# Patient Record
Sex: FEMALE | Race: BLACK OR AFRICAN AMERICAN | NOT HISPANIC OR LATINO | Employment: FULL TIME | URBAN - METROPOLITAN AREA
[De-identification: names, ages, dates, MRNs, and addresses within clinical notes are randomized per-mention and may not be internally consistent; named-entity substitution may affect disease eponyms.]

---

## 2017-01-01 ENCOUNTER — HOSPITAL ENCOUNTER (INPATIENT)
Facility: HOSPITAL | Age: 52
LOS: 1 days | DRG: 597 | End: 2017-01-23
Attending: EMERGENCY MEDICINE | Admitting: HOSPITALIST
Payer: COMMERCIAL

## 2017-01-01 VITALS
WEIGHT: 182.13 LBS | HEART RATE: 94 BPM | OXYGEN SATURATION: 86 % | TEMPERATURE: 98 F | HEIGHT: 66 IN | RESPIRATION RATE: 21 BRPM | BODY MASS INDEX: 29.27 KG/M2 | SYSTOLIC BLOOD PRESSURE: 108 MMHG | DIASTOLIC BLOOD PRESSURE: 55 MMHG

## 2017-01-01 DIAGNOSIS — R79.89 ELEVATED TROPONIN: ICD-10-CM

## 2017-01-01 DIAGNOSIS — N17.9 ACUTE RENAL FAILURE, UNSPECIFIED ACUTE RENAL FAILURE TYPE: Primary | ICD-10-CM

## 2017-01-01 DIAGNOSIS — E87.20 LACTIC ACIDOSIS: ICD-10-CM

## 2017-01-01 DIAGNOSIS — R65.20 SEVERE SEPSIS: ICD-10-CM

## 2017-01-01 DIAGNOSIS — R79.89 ELEVATED BRAIN NATRIURETIC PEPTIDE (BNP) LEVEL: ICD-10-CM

## 2017-01-01 DIAGNOSIS — C50.419 MALIGNANT NEOPLASM OF UPPER-OUTER QUADRANT OF FEMALE BREAST: ICD-10-CM

## 2017-01-01 DIAGNOSIS — R06.03 RESPIRATORY DISTRESS: ICD-10-CM

## 2017-01-01 DIAGNOSIS — A41.9 SEVERE SEPSIS: ICD-10-CM

## 2017-01-01 LAB
ALBUMIN SERPL BCP-MCNC: 3.7 G/DL
ALP SERPL-CCNC: 367 U/L
ALT SERPL W/O P-5'-P-CCNC: 16 U/L
AMPHET+METHAMPHET UR QL: NEGATIVE
ANION GAP SERPL CALC-SCNC: 15 MMOL/L
ANISOCYTOSIS BLD QL SMEAR: SLIGHT
AST SERPL-CCNC: 56 U/L
BACTERIA #/AREA URNS HPF: ABNORMAL /HPF
BARBITURATES UR QL SCN>200 NG/ML: NEGATIVE
BASOPHILS # BLD AUTO: ABNORMAL K/UL
BASOPHILS NFR BLD: 0 %
BENZODIAZ UR QL SCN>200 NG/ML: NEGATIVE
BILIRUB SERPL-MCNC: 0.9 MG/DL
BILIRUB UR QL STRIP: NEGATIVE
BNP SERPL-MCNC: 1146 PG/ML
BUN SERPL-MCNC: 60 MG/DL
BZE UR QL SCN: NEGATIVE
CALCIUM SERPL-MCNC: 10.4 MG/DL
CANNABINOIDS UR QL SCN: NEGATIVE
CHLORIDE SERPL-SCNC: 106 MMOL/L
CLARITY UR: ABNORMAL
CO2 SERPL-SCNC: 16 MMOL/L
COLOR UR: ABNORMAL
CREAT SERPL-MCNC: 4 MG/DL
CREAT UR-MCNC: 361 MG/DL
DIFFERENTIAL METHOD: ABNORMAL
EOSINOPHIL # BLD AUTO: ABNORMAL K/UL
EOSINOPHIL NFR BLD: 0 %
ERYTHROCYTE [DISTWIDTH] IN BLOOD BY AUTOMATED COUNT: 23.2 %
EST. GFR  (AFRICAN AMERICAN): 14 ML/MIN/1.73 M^2
EST. GFR  (NON AFRICAN AMERICAN): 12 ML/MIN/1.73 M^2
GLUCOSE SERPL-MCNC: 125 MG/DL
GLUCOSE UR QL STRIP: NEGATIVE
HCT VFR BLD AUTO: 20.1 %
HGB BLD-MCNC: 5.7 G/DL
HGB UR QL STRIP: ABNORMAL
HYALINE CASTS #/AREA URNS LPF: 5 /LPF
KETONES UR QL STRIP: ABNORMAL
LACTATE SERPL-SCNC: 3.3 MMOL/L
LEUKOCYTE ESTERASE UR QL STRIP: ABNORMAL
LYMPHOCYTES # BLD AUTO: ABNORMAL K/UL
LYMPHOCYTES NFR BLD: 11 %
MCH RBC QN AUTO: 28.9 PG
MCHC RBC AUTO-ENTMCNC: 28.4 %
MCV RBC AUTO: 102 FL
METHADONE UR QL SCN>300 NG/ML: NEGATIVE
MICROSCOPIC COMMENT: ABNORMAL
MONOCYTES # BLD AUTO: ABNORMAL K/UL
MONOCYTES NFR BLD: 5 %
NEUTROPHILS NFR BLD: 77 %
NEUTS BAND NFR BLD MANUAL: 7 %
NITRITE UR QL STRIP: NEGATIVE
OPIATES UR QL SCN: ABNORMAL
OVALOCYTES BLD QL SMEAR: ABNORMAL
PCP UR QL SCN>25 NG/ML: NEGATIVE
PH UR STRIP: 5 [PH] (ref 5–8)
PLATELET # BLD AUTO: 170 K/UL
PMV BLD AUTO: 8.4 FL
POCT GLUCOSE: 151 MG/DL (ref 70–110)
POIKILOCYTOSIS BLD QL SMEAR: SLIGHT
POLYCHROMASIA BLD QL SMEAR: ABNORMAL
POTASSIUM SERPL-SCNC: 6.6 MMOL/L
PROT SERPL-MCNC: 7.6 G/DL
PROT UR QL STRIP: ABNORMAL
RBC # BLD AUTO: 1.97 M/UL
RBC #/AREA URNS HPF: 4 /HPF (ref 0–4)
SODIUM SERPL-SCNC: 137 MMOL/L
SP GR UR STRIP: 1.02 (ref 1–1.03)
SQUAMOUS #/AREA URNS HPF: 3 /HPF
TOXICOLOGY INFORMATION: ABNORMAL
TROPONIN I SERPL DL<=0.01 NG/ML-MCNC: 0.05 NG/ML
URN SPEC COLLECT METH UR: ABNORMAL
UROBILINOGEN UR STRIP-ACNC: NEGATIVE EU/DL
WBC # BLD AUTO: 7.3 K/UL
WBC #/AREA URNS HPF: 6 /HPF (ref 0–5)

## 2017-01-01 PROCEDURE — 82962 GLUCOSE BLOOD TEST: CPT

## 2017-01-01 PROCEDURE — 25000242 PHARM REV CODE 250 ALT 637 W/ HCPCS: Performed by: HOSPITALIST

## 2017-01-01 PROCEDURE — 81000 URINALYSIS NONAUTO W/SCOPE: CPT

## 2017-01-01 PROCEDURE — 11000001 HC ACUTE MED/SURG PRIVATE ROOM

## 2017-01-01 PROCEDURE — 25000003 PHARM REV CODE 250: Performed by: EMERGENCY MEDICINE

## 2017-01-01 PROCEDURE — 84484 ASSAY OF TROPONIN QUANT: CPT

## 2017-01-01 PROCEDURE — 87077 CULTURE AEROBIC IDENTIFY: CPT

## 2017-01-01 PROCEDURE — 63600175 PHARM REV CODE 636 W HCPCS: Performed by: EMERGENCY MEDICINE

## 2017-01-01 PROCEDURE — 87086 URINE CULTURE/COLONY COUNT: CPT

## 2017-01-01 PROCEDURE — 87186 SC STD MICRODIL/AGAR DIL: CPT

## 2017-01-01 PROCEDURE — 99900026 HC AIRWAY MAINTENANCE (STAT)

## 2017-01-01 PROCEDURE — 82570 ASSAY OF URINE CREATININE: CPT

## 2017-01-01 PROCEDURE — 96361 HYDRATE IV INFUSION ADD-ON: CPT

## 2017-01-01 PROCEDURE — 27000221 HC OXYGEN, UP TO 24 HOURS

## 2017-01-01 PROCEDURE — 31720 CLEARANCE OF AIRWAYS: CPT

## 2017-01-01 PROCEDURE — 96360 HYDRATION IV INFUSION INIT: CPT

## 2017-01-01 PROCEDURE — 87088 URINE BACTERIA CULTURE: CPT

## 2017-01-01 PROCEDURE — 94761 N-INVAS EAR/PLS OXIMETRY MLT: CPT

## 2017-01-01 PROCEDURE — 83605 ASSAY OF LACTIC ACID: CPT

## 2017-01-01 PROCEDURE — 94640 AIRWAY INHALATION TREATMENT: CPT

## 2017-01-01 PROCEDURE — 85007 BL SMEAR W/DIFF WBC COUNT: CPT

## 2017-01-01 PROCEDURE — P9612 CATHETERIZE FOR URINE SPEC: HCPCS

## 2017-01-01 PROCEDURE — 99900035 HC TECH TIME PER 15 MIN (STAT)

## 2017-01-01 PROCEDURE — 80053 COMPREHEN METABOLIC PANEL: CPT

## 2017-01-01 PROCEDURE — 85027 COMPLETE CBC AUTOMATED: CPT

## 2017-01-01 PROCEDURE — 99285 EMERGENCY DEPT VISIT HI MDM: CPT | Mod: 25

## 2017-01-01 PROCEDURE — 83880 ASSAY OF NATRIURETIC PEPTIDE: CPT

## 2017-01-01 PROCEDURE — 25000242 PHARM REV CODE 250 ALT 637 W/ HCPCS: Performed by: EMERGENCY MEDICINE

## 2017-01-01 RX ORDER — HYDROMORPHONE HYDROCHLORIDE 2 MG/ML
0.5 INJECTION, SOLUTION INTRAMUSCULAR; INTRAVENOUS; SUBCUTANEOUS EVERY 4 HOURS PRN
Status: DISCONTINUED | OUTPATIENT
Start: 2017-01-01 | End: 2017-01-01

## 2017-01-01 RX ORDER — ALBUTEROL SULFATE 2.5 MG/.5ML
2.5 SOLUTION RESPIRATORY (INHALATION) EVERY 4 HOURS
Status: DISCONTINUED | OUTPATIENT
Start: 2017-01-01 | End: 2017-01-01 | Stop reason: ALTCHOICE

## 2017-01-01 RX ORDER — HYDROMORPHONE HYDROCHLORIDE 2 MG/ML
1 INJECTION, SOLUTION INTRAMUSCULAR; INTRAVENOUS; SUBCUTANEOUS
Status: DISCONTINUED | OUTPATIENT
Start: 2017-01-01 | End: 2017-01-01 | Stop reason: HOSPADM

## 2017-01-01 RX ORDER — ALBUTEROL SULFATE 2.5 MG/.5ML
2.5 SOLUTION RESPIRATORY (INHALATION) EVERY 4 HOURS
Status: DISCONTINUED | OUTPATIENT
Start: 2017-01-01 | End: 2017-01-01 | Stop reason: HOSPADM

## 2017-01-01 RX ORDER — ONDANSETRON 2 MG/ML
4 INJECTION INTRAMUSCULAR; INTRAVENOUS EVERY 12 HOURS PRN
Status: DISCONTINUED | OUTPATIENT
Start: 2017-01-01 | End: 2017-01-01 | Stop reason: HOSPADM

## 2017-01-01 RX ORDER — FUROSEMIDE 10 MG/ML
80 INJECTION INTRAMUSCULAR; INTRAVENOUS
Status: DISCONTINUED | OUTPATIENT
Start: 2017-01-01 | End: 2017-01-01

## 2017-01-01 RX ORDER — SODIUM CHLORIDE 9 MG/ML
INJECTION, SOLUTION INTRAVENOUS CONTINUOUS
Status: DISCONTINUED | OUTPATIENT
Start: 2017-01-01 | End: 2017-01-01

## 2017-01-01 RX ORDER — MORPHINE SULFATE 10 MG/ML
2 INJECTION INTRAMUSCULAR; INTRAVENOUS; SUBCUTANEOUS EVERY 4 HOURS PRN
Status: DISCONTINUED | OUTPATIENT
Start: 2017-01-01 | End: 2017-01-01 | Stop reason: HOSPADM

## 2017-01-01 RX ORDER — ALBUTEROL SULFATE 2.5 MG/.5ML
2.5 SOLUTION RESPIRATORY (INHALATION)
Status: COMPLETED | OUTPATIENT
Start: 2017-01-01 | End: 2017-01-01

## 2017-01-01 RX ORDER — FUROSEMIDE 10 MG/ML
20 INJECTION INTRAMUSCULAR; INTRAVENOUS
Status: DISCONTINUED | OUTPATIENT
Start: 2017-01-01 | End: 2017-01-01

## 2017-01-01 RX ADMIN — SODIUM CHLORIDE: 0.9 INJECTION, SOLUTION INTRAVENOUS at 05:01

## 2017-01-01 RX ADMIN — HYDROMORPHONE HYDROCHLORIDE 0.5 MG: 2 INJECTION INTRAMUSCULAR; INTRAVENOUS; SUBCUTANEOUS at 12:01

## 2017-01-01 RX ADMIN — ALBUTEROL SULFATE 2.5 MG: 2.5 SOLUTION RESPIRATORY (INHALATION) at 08:01

## 2017-01-01 RX ADMIN — ALBUTEROL SULFATE 2.5 MG: 2.5 SOLUTION RESPIRATORY (INHALATION) at 07:01

## 2017-01-01 RX ADMIN — HYDROMORPHONE HYDROCHLORIDE 0.5 MG: 2 INJECTION INTRAMUSCULAR; INTRAVENOUS; SUBCUTANEOUS at 05:01

## 2017-01-01 RX ADMIN — ALBUTEROL SULFATE 2.5 MG: 2.5 SOLUTION RESPIRATORY (INHALATION) at 02:01

## 2017-01-01 RX ADMIN — SODIUM CHLORIDE: 0.9 INJECTION, SOLUTION INTRAVENOUS at 08:01

## 2017-01-01 RX ADMIN — SODIUM CHLORIDE 1000 ML: 0.9 INJECTION, SOLUTION INTRAVENOUS at 02:01

## 2017-01-01 RX ADMIN — ALBUTEROL SULFATE 2.5 MG: 2.5 SOLUTION RESPIRATORY (INHALATION) at 03:01

## 2017-01-01 RX ADMIN — HYDROMORPHONE HYDROCHLORIDE 0.5 MG: 2 INJECTION INTRAMUSCULAR; INTRAVENOUS; SUBCUTANEOUS at 09:01

## 2017-01-01 RX ADMIN — ALBUTEROL SULFATE 2.5 MG: 2.5 SOLUTION RESPIRATORY (INHALATION) at 11:01

## 2017-01-01 RX ADMIN — HYDROMORPHONE HYDROCHLORIDE 0.5 MG: 2 INJECTION INTRAMUSCULAR; INTRAVENOUS; SUBCUTANEOUS at 08:01

## 2017-01-22 PROBLEM — N17.9 ACUTE RENAL FAILURE: Status: ACTIVE | Noted: 2017-01-01

## 2017-01-22 NOTE — ED PROVIDER NOTES
Encounter Date: 2017    SCRIBE #1 NOTE: I, Dar Cordero, am scribing for, and in the presence of, Angie Villegas MD. Other sections scribed: HPI, ROS.       History     Chief Complaint   Patient presents with    Respiratory Distress     Per EMS, called to pt's home because pt had become very lethargic with difficulty breathing. Narcan given on the scene. Pt response improved slightly. Pt has history of metastatic breast cancer.      Review of patient's allergies indicates:  No Known Allergies  HPI Comments: CC: Respiratory Distress  HPI: This 51 y.o. female with GERD and Hx of breast cancer w/ metastasis to neck presents to the ED via EMS for emergent evaluation of respiratory distress after caregiver found pt unresponsive at home this afternoon. She states pt has been lethargic since yesterday. EMS arrived on scene to find pt unresponsive breathing 8-10 times per minute with O2 saturation 91-92%, rising to 99% after EMS administered 0.5 mg Narcan and placed her on NRB. Caregiver states pt has not taken any of her medications today that she is aware of, but she is concerned pt may have overdosed on one of them.     Hx is otherwise limited 2nd/to altered mental status.      The history is provided by the EMS personnel and a caregiver.     Past Medical History   Diagnosis Date    Anemia     Blood transfusion     Breast cancer 7/10     metastatic disease 2013    GERD (gastroesophageal reflux disease)     Neuromuscular disorder      No past medical history pertinent negatives.  Past Surgical History   Procedure Laterality Date    Mastectomy      Breast lumpectomy      Mediport insertion, single       twice     section, classic       2 c-sections     Family History   Problem Relation Age of Onset    Diabetes Mother      Social History   Substance Use Topics    Smoking status: Never Smoker    Smokeless tobacco: None    Alcohol use No     Review of Systems   Unable to perform ROS: Mental  status change       Physical Exam   Initial Vitals   BP Pulse Resp Temp SpO2   -- -- -- -- --            Physical Exam    Nursing note and vitals reviewed.  HENT:   Head: Normocephalic and atraumatic.   Eyes: Pupils are equal, round, and reactive to light.   Neck:       Large fungating mass to anterior chest wall.   Cardiovascular: Regular rhythm. Tachycardia present.    Pulses:       Radial pulses are 2+ on the right side, and 2+ on the left side.   Skin perfusion exam: skin is warm and well perfused    Capillary refill is <2s    Pulses distally are intact.    Pulmonary/Chest: Tachypnea noted. She is in respiratory distress.   Coarse breath sounds    Abdominal: She exhibits distension. There is no tenderness. There is no rebound.   Firm distended abdomen, dull to percussion.    Musculoskeletal: Normal range of motion.   Bilateral lower extremity edema.    Neurological: She is unresponsive. GCS eye subscore is 3. GCS verbal subscore is 1. GCS motor subscore is 4.   Skin: Skin is warm and dry.   Psychiatric:   Altered and nonverbal, but opens eyes to verbal stimuli.          ED Course   Critical Care  Date/Time: 1/22/2017 6:29 PM  Performed by: TRACI PERSON  Authorized by: TRACI PERSON   Direct patient critical care time: 40 minutes  Additional history critical care time: 10 minutes  Ordering / reviewing critical care time: 10 minutes  Documentation critical care time: 8 minutes  Consulting other physicians critical care time: 5 minutes  Consult with family critical care time: 20 minutes  Total critical care time (exclusive of procedural time) : 93 minutes  Critical care time was exclusive of separately billable procedures and treating other patients and teaching time.  Critical care was necessary to treat or prevent imminent or life-threatening deterioration of the following conditions: cardiac failure, dehydration, metabolic crisis, sepsis, renal failure and respiratory failure.  Critical care was time spent  personally by me on the following activities: blood draw for specimens, development of treatment plan with patient or surrogate, discussions with consultants, evaluation of patient's response to treatment, examination of patient, obtaining history from patient or surrogate, ordering and performing treatments and interventions, ordering and review of laboratory studies, ordering and review of radiographic studies, pulse oximetry and re-evaluation of patient's condition.        Labs Reviewed   CBC W/ AUTO DIFFERENTIAL - Abnormal; Notable for the following:        Result Value    RBC 1.97 (*)     Hemoglobin 5.7 (*)     Hematocrit 20.1 (*)      (*)     MCHC 28.4 (*)     RDW 23.2 (*)     MPV 8.4 (*)     Gran% 77.0 (*)     Lymph% 11.0 (*)     All other components within normal limits    Narrative:     Hgb   critical result(s) called and verbal readback obtained from   Nikki Hargrove, 01/22/2017 14:20   COMPREHENSIVE METABOLIC PANEL - Abnormal; Notable for the following:     Potassium 6.6 (*)     CO2 16 (*)     Glucose 125 (*)     BUN, Bld 60 (*)     Creatinine 4.0 (*)     Alkaline Phosphatase 367 (*)     AST 56 (*)     eGFR if  14 (*)     eGFR if non  12 (*)     All other components within normal limits    Narrative:      Potassium  critical result(s) called and verbal readback obtained   from Daysi Hampton. , 01/22/2017 13:52   DRUG SCREEN PANEL, URINE EMERGENCY - Abnormal; Notable for the following:     Creatinine, Random Ur 361.0 (*)     All other components within normal limits   LACTIC ACID, PLASMA - Abnormal; Notable for the following:     Lactate (Lactic Acid) 3.3 (*)     All other components within normal limits   URINALYSIS - Abnormal; Notable for the following:     Appearance, UA Hazy (*)     Protein, UA 1+ (*)     Ketones, UA Trace (*)     Occult Blood UA 2+ (*)     Leukocytes, UA Trace (*)     All other components within normal limits   TROPONIN I - Abnormal; Notable  for the following:     Troponin I 0.049 (*)     All other components within normal limits   B-TYPE NATRIURETIC PEPTIDE - Abnormal; Notable for the following:     BNP 1146 (*)     All other components within normal limits   URINALYSIS MICROSCOPIC - Abnormal; Notable for the following:     WBC, UA 6 (*)     Hyaline Casts, UA 5 (*)     All other components within normal limits   POCT GLUCOSE - Abnormal; Notable for the following:     POCT Glucose 151 (*)     All other components within normal limits   CULTURE, URINE     EKG Readings: (Independently Interpreted)   Initial Reading: No STEMI. Rhythm: Sinus Tachycardia.       X-Rays:   Independently Interpreted Readings:   Chest X-Ray: Cardiomegaly present.     Medical Decision Making:   History:   I obtained history from: someone other than patient and EMS provider.       <> Summary of History: Family friend, mother, and brother  Old Medical Records: I decided to obtain old medical records.  Initial Assessment:   Emergent evaluation of a 51-year-old woman who presented to the emergency department today secondary to altered mental status and respiratory distress  Differential Diagnosis:   Respiratory failure, acute coronary syndrome, electrolyte abnormality, metastatic breast cancer  Independently Interpreted Test(s):   I have ordered and independently interpreted X-rays - see prior notes.  I have ordered and independently interpreted EKG Reading(s) - see prior notes  Clinical Tests:   Lab Tests: Ordered and Reviewed  The following lab test(s) were unremarkable: CBC, CMP, Urinalysis, BNP, Troponin and Lactate  Radiological Study: Ordered and Reviewed  Medical Tests: Ordered and Reviewed  ED Management:  On physical examination, patient was critically ill appearing with an abnormal respiratory pattern, altered sensorium, tachycardia, and a large, fungating mass to her upper anterior chest/lower neck.  She had a distended, firm abdomen which was dull to percussion.  She  "opened her eyes to verbal stimuli when they forcefully asked her to open her eyes however otherwise did not respond.  She did not respond to verbal commands.  She did withdraw from pain.  She had coarse breath sounds bilaterally.  She had lower extremity edema.    Chest x-ray, labs, and IV fluids were ordered (however, given pt appeared fluid overloaded with edema and coarse lung sounds, sepsis protocol fluid bolus was not ordered).  She was given an albuterol neb treatment.  Patient's family friend was immediately consulted and CODE STATUS was discussed by Dr. Bishop:     I, Angie Bishop MD, had a long conversation at bedside with the patient's caregiver. We discussed the patient's HPI and goals of care. She states, the patient was being treated at [place] [facility] prior to September 2016 for metastatic breast cancer.  In September, she was told there was "nothing more they could do and needed hospice". At that time she was released from their care and returned back to [place].  The patient's best friend/caregiver has been helping with the patient's ADLs as well as medications.  The patient has not seen a physician since September of last year.  She was noted a decline in functional status of the patient over the last two weeks, worse over the past 5 days. Also notes, the patient told her recently "I am so weak" and started to c/o body pain.  Given the patient's comorbidities and expected clinical course, the caregiver states that the patient would not wish to further extend her life with heroic measures. She requests comfort care, palliative care consult and agrees that patient should be DNI/DNR. Extended family (mother) was contacted by Dr. Villegas and case discussed. She agrees to DNR/DNI and requests comfort care for her dying daughter.  Angie Bsihop M.D.  3:46 PM 1/22/2017      Dr Villegas:  This was confirmed by both the patient's mother via telephone as she lives in [place].  Patient was " therefore made DO NOT RESUSCITATE/DO NOT INTUBATE.    Patient's labs returned.  She was positive for opiates, which she is prescribed for her cancer pain.  Her urinalysis showed trace leukocytes.  Her CBC was concerning for anemia at 5.7 and 20.1 with a red blood cell count of 1.97.  She had 7.0 bands.  Her CMP was highly concerning with an elevated potassium of 6.6, a CO2 of 16, a BUN/creatinine is 64.0, and mildly worsening of her alkaline phosphatase at 367.  Her lactate was 3.3.  Her troponin was elevated at 0.049 and her BNP was elevated at 1146.  EKG showed no evidence of STEMI.    Chest x-ray: Final result by Derick Nassar MD (01/22/17 13:42:40)   Impression: Increased interstitial markings and patchy subsegmental opacities throughout both lungs.  This could represent pneumonia or pulmonary edema. Cardiomegaly.    Given patient's multiple organ system dysfunction, patient was consulted to Palliative Care as well as Spiritual Care.  At this time, we will continue comfort care with gentle IV hydration, pain control and antiemetics, supplemental oxygen, and albuterol treatments.  She will be admitted to internal medicine while awaiting palliative care.  I've discussed this with the on-call hospitalist, Dr. Ramon, who agrees to this plan. I have discussed this with the family who agrees with this plan.     Angie Villegas MD  6:42 PM  1/22/2017               Other:   I have discussed this case with another health care provider.       <> Summary of the Discussion: Dr. Ramon, as above.             Scribe Attestation:   Scribe #1: I performed the above scribed service and the documentation accurately describes the services I performed. I attest to the accuracy of the note.    Attending Attestation:           Physician Attestation for Scribe:  Physician Attestation Statement for Scribe #1: I, Angie Villegas MD, reviewed documentation, as scribed by Dar Cordero in my presence, and it is both accurate and  complete.                 ED Course     Clinical Impression:   The primary encounter diagnosis was Acute renal failure, unspecified acute renal failure type. Diagnoses of Respiratory distress, Lactic acidosis, Elevated troponin, Elevated brain natriuretic peptide (BNP) level, and Severe sepsis were also pertinent to this visit.    Disposition:   Disposition: Admitted  Condition: Critical       Angie Villegas MD  01/22/17 [id number]       Angie Villegas MD  01/22/17 [id number]

## 2017-01-22 NOTE — ED TRIAGE NOTES
Pt presents to ED via EMS very lethargic. Non-rebreather in place. Pt not answering questions. Per EMS, called to scene for increased respiratory effort and lethargy at home. EMS reports pt was unresponsive with pinpoint pupils on arrival, 0.5 Narcan given. Pt became more responsive but still very lethargic. Pt has history of metastatic breast cancer.

## 2017-01-23 PROBLEM — R41.89 UNRESPONSIVENESS: Status: ACTIVE | Noted: 2017-01-01

## 2017-01-23 PROBLEM — E87.5 HYPERKALEMIA: Status: ACTIVE | Noted: 2017-01-01

## 2017-01-23 NOTE — ASSESSMENT & PLAN NOTE
Patient is hospice  candidate,she is actively dying,consulted palliative care,spoke with family sister,all family are underway,they agree with DNR status and hospice care.

## 2017-01-23 NOTE — PROGRESS NOTES
Friends and caregiver concerned of patient lack of urine output. Bladder scan performed per charge nurse. Bladder scan volume = 100. Will continue to monitor.

## 2017-01-23 NOTE — PLAN OF CARE
Recommendations     Recommendation/Intervention:   1. Should family desire nutrition support-consult for rec's.   2. RD to monitor  Goals: comfort measures  Nutrition Goal Status: new  Communication of RD Recs: reviewed with RN     Continuum of Care Plan     Referral to Outpatient Services: (D/C planning: comfort care)

## 2017-01-23 NOTE — DISCHARGE SUMMARY
[facility] Hospital Medicine  Discharge Summary      Patient Name: Sania Sloan  MRN: [id number]  Admission Date: 1/22/2017  Hospital Length of Stay: 1 days  Discharge Date and Time: 1/23/2017 2:17 PM  Attending Physician: Sanjay Ramon MD   Discharging Provider: Sanjay Ramon MD  Primary Care Provider: Day Hood MD      HPI:    This 51 y.o. female with GERD and Hx of breast cancer w/ metastasis to neck presents to the ED via EMS for emergent evaluation of respiratory distress after caregiver found pt unresponsive at home this afternoon. She states pt has been lethargic since yesterday. EMS arrived on scene to find pt unresponsive breathing 8-10 times per minute with O2 saturation 91-92%, rising to 99% after EMS administered 0.5 mg Narcan and placed her on NRB. Caregiver states pt has not taken any of her medications  that she is aware of, but she is concerned pt may have overdosed on one of them.Patient had no response to narcan,she has uncurable metastatic breast cancer,alreday was under treatment in Cancer center in [place] and they recommended Hospice,familya ll agree with Hospice,patient is DNR with family agreement and palliative care has  been consulted.    * No surgery found *      Indwelling Lines/Drains at time of discharge:   Lines/Drains/Airways     Drain                 Urethral Catheter 01/23/17 1048 16 Fr. less than 1 day              Hospital Course:    This 51 y.o. female with GERD and Hx of breast cancer w/ metastasis to neck presents to the ED via EMS for emergent evaluation of respiratory distress after caregiver found pt unresponsive at home ,per care giver  She states pt has been lethargic since 2 days,. EMS arrived on scene to find pt unresponsive breathing 8-10 times per minute with O2 saturation 91-92%, rising to 99% after EMS administered 0.5 mg Narcan and placed her on NRB. Caregiver states pt has not taken any of her medications  that  she is aware of, but she is concerned pt may have overdosed on one of them.Patient had no response to narcan,she has uncurable metastatic breast cancer,shannon was under treatment in Cancer center in [place] and they recommended Hospice,family was agree with Hospice,patient was  DNR with family agreement and palliative care has  been consulted.patient was only ob\ IVF and comfort pain medication,family mother and sister has been well informed regarding imminent deaths.patient has  on 17 day after admission on comfort care.     Consults:   Consults         Status Ordering Provider     Inpatient consult to Palliative Care  Once     Provider:  Silvia Jacobsen RN    Completed TRACI PERSON     Inpatient consult to Spiritual Care  Once     Provider:  (Not yet assigned)    Acknowledged TRACI PERSON          Significant Diagnostic Studies: Labs:   BMP:   Recent Labs  Lab 17  1305   *      K 6.6*      CO2 16*   BUN 60*   CREATININE 4.0*   CALCIUM 10.4    and CBC   Recent Labs  Lab 17  1305   WBC 7.30   HGB 5.7*   HCT 20.1*        Radiology: X-Ray: CXR: X-Ray Chest 1 View (CXR):   Results for orders placed or performed during the hospital encounter of 17   X-Ray Chest 1 View    Narrative    Provided clinical history :  Dyspnea, unspecified    Additional clinical history: Metastatic breast cancer to the liver and bones     TECHNIQUE: Single view portable frontal radiograph of the chest    Comparison: None available     Findings:    Patient is significantly rotated to the left.    Support devices: Cardiac monitoring leads project over the chest.  Right anterior chest wall port with transvenous catheter extending to the right IJ, with its tip projecting over the SVC.    Chest: Cardiac silhouette is enlarged.  There are increased interstitial markings and patchy subsegmental opacities throughout both lungs.  Small bilateral pleural effusions are suspected.  No  pneumothorax.    Upper Abdomen: Negative.    Other: Metastatic osseous lesions noted in the distal right clavicle, acromion, and humerus.  There are surgical clips projecting over the left chest and left axilla.    Impression    Increased interstitial markings and patchy subsegmental opacities throughout both lungs.  This could represent pneumonia or pulmonary edema.    Cardiomegaly.  ______________________________________     Electronically signed by resident: OLE MAYS MD  Date:     17  Time:    13:37            As the supervising and teaching physician, I personally reviewed the images and resident's interpretation and I agree with the findings.          Electronically signed by: DANNY SWANSON MD  Date:     17  Time:    13:42        Pending Diagnostic Studies:     None        Final Active Diagnoses:    Diagnosis Date Noted POA    PRINCIPAL PROBLEM:  Malignant neoplasm of upper-outer quadrant of female breast [C50.419] 2013 Yes    Acute renal failure [N17.9] 2017 Yes    Hyperkalemia [E87.5] 2017 Yes    Unresponsiveness [R41.89] 2017 Yes      Problems Resolved During this Admission:    Diagnosis Date Noted Date Resolved POA      No new Assessment & Plan notes have been filed under this hospital service since the last note was generated.  Service: Hospital Medicine      Discharged Condition:     Disposition:     Follow Up:N/P    Patient Instructions:   No discharge procedures on file.  Medications:  Reconciled Home Medications:   Current Discharge Medication List      CONTINUE these medications which have NOT CHANGED    Details   amlodipine (NORVASC) 5 MG tablet Take 5 mg by mouth once daily.      anastrozole (ARIMIDEX) 1 mg Tab once daily.       cloNIDine (CATAPRES) 0.1 MG tablet Take 0.1 mg by mouth 2 (two) times daily.      dexamethasone (DECADRON) 4 MG Tab       gabapentin (NEURONTIN) 600 MG tablet Take 600 mg by mouth 3 (three) times daily.       methocarbamol (ROBAXIN) 750 MG Tab       multivitamin (THERAGRAN) per tablet Take 1 tablet by mouth once daily.      omeprazole (PRILOSEC) 20 MG capsule       oxycodone-acetaminophen (PERCOCET)  mg per tablet Take 1 tablet by mouth every 4 (four) hours as needed for Pain.      potassium chloride SA (K-DUR,KLOR-CON) 20 MEQ tablet            Time spent on the discharge of patient: 30  minutes    Sanjay Ramon MD  Department of Hospital Medicine  [facility]

## 2017-01-23 NOTE — PLAN OF CARE
Problem: Infection, Risk/Actual (Adult)  Goal: Identify Related Risk Factors and Signs and Symptoms  Related risk factors and signs and symptoms are identified upon initiation of Human Response Clinical Practice Guideline (CPG)   Outcome: Ongoing (interventions implemented as appropriate)  Afebrile during shift. Patient will be free of signs and symptoms of infection prior to discharge.     Problem: Patient Care Overview  Goal: Plan of Care Review  Outcome: Ongoing (interventions implemented as appropriate)  Patient nonverbal, very weak, and lethargic during shift. No voiding noted during shift. Bladder scan performed. Skin integrity maintained. Pain controlled with Iv pain meds. Vs stable. Npo diet and Iv fluids maintained. Free of falls. Call light within reach. Bed in low position. Continue plan of care. Friends and caregiver remains at bedside.         Problem: Fall Risk (Adult)  Goal: Identify Related Risk Factors and Signs and Symptoms  Related risk factors and signs and symptoms are identified upon initiation of Human Response Clinical Practice Guideline (CPG)   Outcome: Ongoing (interventions implemented as appropriate)  Patient will remain free from falls,trauma,and injury while hospitalized.     Problem: Pressure Ulcer Risk (Trever Scale) (Adult,Obstetrics,Pediatric)  Goal: Identify Related Risk Factors and Signs and Symptoms  Related risk factors and signs and symptoms are identified upon initiation of Human Response Clinical Practice Guideline (CPG)   Outcome: Ongoing (interventions implemented as appropriate)  Skin integrity maintained. Patient turned every 2 hours to prevent skin breakdown.

## 2017-01-23 NOTE — H&P
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 female with GERD and Hx of breast cancer w/ metastasis to neck presents to the ED via EMS for emergent evaluation of respiratory distress after caregiver found pt unresponsive at home this afternoon. She states pt has been lethargic since yesterday. EMS arrived on scene to find pt unresponsive breathing 8-10 times per minute with O2 saturation 91-92%, rising to 99% after EMS administered 0.5 mg Narcan and placed her on NRB. Caregiver states pt has not taken any of her medications  that she is aware of, but she is concerned pt may have overdosed on one of them.Patient had no response to narcan,she has uncurable metastatic breast cancer,alreday was under treatment in Cancer center in [place] and they recommended Hospice,familya ll agree with Hospice,patient is DNR with family agreement and palliative care has  been consulted.    Past Medical History   Diagnosis Date    Anemia     Blood transfusion     Breast cancer 7/10     metastatic disease 2013    GERD (gastroesophageal reflux disease)     Neuromuscular disorder        Past Surgical History   Procedure Laterality Date    Mastectomy      Breast lumpectomy      Mediport insertion, single       twice     section, classic       2 c-sections       Review of patient's allergies indicates:  No Known Allergies    No current facility-administered medications on file prior to encounter.      Current Outpatient Prescriptions on File Prior to Encounter   Medication Sig    amlodipine (NORVASC) 5 MG  tablet Take 5 mg by mouth once daily.    anastrozole (ARIMIDEX) 1 mg Tab once daily.     cloNIDine (CATAPRES) 0.1 MG tablet Take 0.1 mg by mouth 2 (two) times daily.    dexamethasone (DECADRON) 4 MG Tab     gabapentin (NEURONTIN) 600 MG tablet Take 600 mg by mouth 3 (three) times daily.    methocarbamol (ROBAXIN) 750 MG Tab     multivitamin (THERAGRAN) per tablet Take 1 tablet by mouth once daily.    omeprazole (PRILOSEC) 20 MG capsule     oxycodone-acetaminophen (PERCOCET)  mg per tablet Take 1 tablet by mouth every 4 (four) hours as needed for Pain.    potassium chloride SA (K-DUR,KLOR-CON) 20 MEQ tablet      Family History     Problem Relation (Age of Onset)    Diabetes Mother        Social History Main Topics    Smoking status: Never Smoker    Smokeless tobacco: Not on file    Alcohol use No    Drug use: Not on file    Sexual activity: Not on file     Review of Systems,nit able done,patient is not responding.  Objective:     Vital Signs (Most Recent):  Temp: 97.5 °F (36.4 °C) (01/22/17 2345)  Pulse: 105 (01/23/17 0349)  Resp: 15 (01/23/17 0349)  BP: 126/62 (01/22/17 2345)  SpO2: (!) 92 % (01/23/17 0349) Vital Signs (24h Range):  Temp:  [97.5 °F (36.4 °C)-97.7 °F (36.5 °C)] 97.5 °F (36.4 °C)  Pulse:  [] 105  Resp:  [9-17] 15  SpO2:  [92 %-100 %] 92 %  BP: (119-154)/(59-80) 126/62     Weight: 82.6 kg (182 lb 1.6 oz)  Body mass index is 29.39 kg/(m^2).    Physical Exam   HENT:   Head: Atraumatic.   Neck:   enlarged neck lymphnodes   Cardiovascular: Normal rate.    Pulmonary/Chest: She has rales.   Abdominal: She exhibits distension. There is tenderness.   Musculoskeletal: She exhibits edema.   Skin: Skin is warm. She is diaphoretic.        Significant Labs:   BMP:   Recent Labs  Lab 01/22/17  1305   *      K 6.6*      CO2 16*   BUN 60*   CREATININE 4.0*   CALCIUM 10.4     CBC:   Recent Labs  Lab 01/22/17  1305   WBC 7.30   HGB 5.7*   HCT 20.1*          Significant  Imaging: reviewed.    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      Acute renal failure  Continue with IVF.      Hyperkalemia  Continue with Albuterol.      Unresponsiveness  Duo to metastatic breast cancer,end stage.      VTE Risk Mitigation         Ordered     High Risk of VTE  Once      01/22/17 1840     Reason for No Pharmacological VTE Prophylaxis  Once      01/22/17 1840        Sanjay Ramon MD  Department of Hospital Medicine   [facility]

## 2017-01-23 NOTE — PROGRESS NOTES
Received call to room from pt family, stating they felt as if she had stop breathing. Io breath sounds heard throughout, no pulse palpated. Pt no responsive to verbal or painful stimuli. Informed Primary nurse, Charge nurse and Dr Schmitt of pt passing.

## 2017-01-23 NOTE — PROGRESS NOTES
Dr. Coelho informed that friends and caregiver are requesting orders for deep suctioning. Deep suctioning ordered prn. Will continue to monitor.

## 2017-01-23 NOTE — PROGRESS NOTES
PALLIATIVE CARE PROGRESS NOTE:    F/u bedside visit.  Bedside RN provided pain medication.  Patient is still moaning.  Will discuss with Dr. Schmitt need for increase in frequency and/or dosage.  She is actively dying and agonal respiratory effort noted now.  Awaiting arrival of family members.  Doubtful she will be able to transfer to hospice.      (1400) Notified by  that patient has .    (1600)  Bedside bereavement visit.  Friends and some family (cousin) have gathered at the bedside.  Still waiting for sister and other relatives from [place].  Her mother does not arrive until around 10:30 p.m. Tonight.  Condolences and emotional support provided.    Silvia Jacobsen, VINHN, RN, CCRN   Palliative Care Nurse Coordinator   [facility]  [phone]
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
## 2017-01-23 NOTE — PROGRESS NOTES
Pt was received on 5L NC sats in low 80's put pt on 55% venti mask sats 88-89%. Put pt on NRB mask sats 90% NT sx bloody secretions.

## 2017-01-23 NOTE — PROGRESS NOTES
[facility]  Adult Nutrition  Consult Note    SUMMARY     Recommendations    Recommendation/Intervention:   1. Should family desire nutrition support-consult for rec's.   2. RD to monitor  Goals: comfort measures  Nutrition Goal Status: new  Communication of RD Recs: reviewed with RN    Continuum of Care Plan    Referral to Outpatient Services:  (D/C planning: comfort care)    Reason for Assessment    Reason for Assessment: identified at risk by screening criteria  Diagnosis: cancer diagnosis/related complications (End Stage Breast Cancer)  Relevent Medical History: Gerd, Anemia   Interdisciplinary Rounds: did not attend     General Information Comments: Pt.unresponsive. Palliative care following and rec d/c IV Fluids-pt. actively dying. Family to arrive. DNR.    Nutrition Prescription Ordered    Current Diet Order: NPO       Nutrition Risk Screen     Nutrition Risk Screen: other (see comments) (Unabe to assess)    Nutrition/Diet History     Typical Food/Fluid Intake: RADHA  Food Preferences: RADHA      Factors Affecting Nutritional Intake: impaired cognitive status/motor control     Labs/Tests/Procedures/Meds     Pertinent Labs Reviewed: reviewed  Pertinent Medications Reviewed: reviewed     Physical Findings  Overall appearance: overweight   Oral/Mouth Cavity: WDL  Skin: intact    Anthropometrics     Height (inches): 65.98 in     Weight (kg): 82.6 kg     Ideal Body Weight (IBW), Female: 129.9 lb     % Ideal Body Weight, Female (lb): 140.18 lb  BMI (kg/m2): 29.41  BMI Grade: 25 - 29.9 - overweight     Estimated/Assessed Needs    Weight Used For Calorie Calculations: 82.6 kg (182 lb 1.6 oz)   Height (cm): 167.6 cm     Energy Need Method: [place]-St Jeor (0604-4924 kcal)     RMR ([place]-St. Jeor Equation): 1465.67      Weight Used For Protein Calculations: 82.6 kg (182 lb 1.6 oz)  Protein Requirements:  g    Fluid Need Method: RDA Method     Malnutrition (Undernutrition) Diagnosis    % Intake of  Estimated Energy Needs: 0 - 25%  % Meal Intake: NPO     Nutrition Diagnosis    Nutrition Problem: Other (see comments) (No nutrition issues-pt. actively dying/DNR)     Monitor and Evaluation    Food and Nutrient Intake: energy intake  Food and Nutrient Adminstration: diet order   Anthropometric Measurements: weight, weight change  Biochemical Data, Medical Tests and Procedures: electrolyte and renal panel, glucose/endocrine profile  Nutrition-Focused Physical Findings: overall appearance    Nutrition Risk    Level of Risk: low (1 x week)    Nutrition Follow-Up    RD Follow-up?: Yes

## 2017-01-23 NOTE — PROGRESS NOTES
1030 Contact from Carol of Palliative Care stating will meet with pt. Had spoken with [facility] for inpt admit; has bed availability. Will f/u with [place].    1530 Informed by floor nurse, Angelina, pt .

## 2017-01-23 NOTE — PROGRESS NOTES
Pt remains cleaned, wrapped and tagged.  Awaiting transportation to [facility].

## 2017-01-23 NOTE — SUBJECTIVE & OBJECTIVE
Past Medical History   Diagnosis Date    Anemia     Blood transfusion     Breast cancer 7/10     metastatic disease 2013    GERD (gastroesophageal reflux disease)     Neuromuscular disorder        Past Surgical History   Procedure Laterality Date    Mastectomy      Breast lumpectomy      Mediport insertion, single       twice     section, classic       2 c-sections       Review of patient's allergies indicates:  No Known Allergies    No current facility-administered medications on file prior to encounter.      Current Outpatient Prescriptions on File Prior to Encounter   Medication Sig    amlodipine (NORVASC) 5 MG tablet Take 5 mg by mouth once daily.    anastrozole (ARIMIDEX) 1 mg Tab once daily.     cloNIDine (CATAPRES) 0.1 MG tablet Take 0.1 mg by mouth 2 (two) times daily.    dexamethasone (DECADRON) 4 MG Tab     gabapentin (NEURONTIN) 600 MG tablet Take 600 mg by mouth 3 (three) times daily.    methocarbamol (ROBAXIN) 750 MG Tab     multivitamin (THERAGRAN) per tablet Take 1 tablet by mouth once daily.    omeprazole (PRILOSEC) 20 MG capsule     oxycodone-acetaminophen (PERCOCET)  mg per tablet Take 1 tablet by mouth every 4 (four) hours as needed for Pain.    potassium chloride SA (K-DUR,KLOR-CON) 20 MEQ tablet      Family History     Problem Relation (Age of Onset)    Diabetes Mother        Social History Main Topics    Smoking status: Never Smoker    Smokeless tobacco: Not on file    Alcohol use No    Drug use: Not on file    Sexual activity: Not on file     Review of Systems,nit able done,patient is not responding.  Objective:     Vital Signs (Most Recent):  Temp: 97.5 °F (36.4 °C) (17 2345)  Pulse: 105 (17 0349)  Resp: 15 (17)  BP: 126/62 (17 2345)  SpO2: (!) 92 % (17 034) Vital Signs (24h Range):  Temp:  [97.5 °F (36.4 °C)-97.7 °F (36.5 °C)] 97.5 °F (36.4 °C)  Pulse:  [] 105  Resp:  [9-17] 15  SpO2:  [92 %-100 %] 92  %  BP: (119-154)/(59-80) 126/62     Weight: 82.6 kg (182 lb 1.6 oz)  Body mass index is 29.39 kg/(m^2).    Physical Exam   HENT:   Head: Atraumatic.   Neck:   enlarged neck lymphnodes   Cardiovascular: Normal rate.    Pulmonary/Chest: She has rales.   Abdominal: She exhibits distension. There is tenderness.   Musculoskeletal: She exhibits edema.   Skin: Skin is warm. She is diaphoretic.        Significant Labs:   BMP:   Recent Labs  Lab 01/22/17  1305   *      K 6.6*      CO2 16*   BUN 60*   CREATININE 4.0*   CALCIUM 10.4     CBC:   Recent Labs  Lab 01/22/17  1305   WBC 7.30   HGB 5.7*   HCT 20.1*          Significant Imaging: reviewed.

## 2017-01-23 NOTE — PROGRESS NOTES
Called to pronounce Mrs. Sloan dead.  No spontaneous breaths. No heart sounds or pulses. Patient declared deat at 1:53. Discussed briefly with .  Provided emotional support to family/care givers.

## 2017-01-23 NOTE — PROGRESS NOTES
The pt was received on 3 liters nasal cannula with a sat of 92%. Her breath sounds are coarse. Her family members stated that the pt cannot cough up secretions and wanted to know if the pt can get orders for NT suctioning. She tolerated her treatment with MARIA DOLORES.

## 2017-01-24 LAB — BACTERIA UR CULT: NORMAL
